# Patient Record
Sex: FEMALE | Race: OTHER | NOT HISPANIC OR LATINO | Employment: UNEMPLOYED | URBAN - METROPOLITAN AREA
[De-identification: names, ages, dates, MRNs, and addresses within clinical notes are randomized per-mention and may not be internally consistent; named-entity substitution may affect disease eponyms.]

---

## 2022-07-27 ENCOUNTER — TELEPHONE (OUTPATIENT)
Dept: FAMILY MEDICINE | Facility: CLINIC | Age: 52
End: 2022-07-27

## 2022-07-27 NOTE — TELEPHONE ENCOUNTER
Spoke to patient regarding this information. Patient will have [facility] fax results to office.          ----- Message from Elisabeth Jo sent at 7/27/2022  8:20 AM CDT -----  Regarding: MRI Results and Pain  Patient called to speak with the provider regarding her recent MRI findings.   She reports that she is in a lot of pain.  Wants to know what's her next plan of action will be.  Please advise and Thank You.  Patient contact info [phone].